# Patient Record
Sex: MALE | Race: WHITE | NOT HISPANIC OR LATINO | Employment: FULL TIME | URBAN - METROPOLITAN AREA
[De-identification: names, ages, dates, MRNs, and addresses within clinical notes are randomized per-mention and may not be internally consistent; named-entity substitution may affect disease eponyms.]

---

## 2018-08-24 ENCOUNTER — OFFICE VISIT - HEALTHEAST (OUTPATIENT)
Dept: FAMILY MEDICINE | Facility: CLINIC | Age: 58
End: 2018-08-24

## 2018-08-24 DIAGNOSIS — K21.00 REFLUX ESOPHAGITIS: ICD-10-CM

## 2018-08-24 DIAGNOSIS — F17.200 TOBACCO USE DISORDER: ICD-10-CM

## 2018-08-24 DIAGNOSIS — F10.20 ALCOHOLISM (H): ICD-10-CM

## 2018-08-24 DIAGNOSIS — Z12.5 SCREENING FOR PROSTATE CANCER: ICD-10-CM

## 2018-08-24 DIAGNOSIS — Z13.220 SCREENING FOR LIPID DISORDERS: ICD-10-CM

## 2018-08-24 DIAGNOSIS — Z11.3 SCREEN FOR STD (SEXUALLY TRANSMITTED DISEASE): ICD-10-CM

## 2018-08-24 DIAGNOSIS — Z00.00 PHYSICAL EXAM: ICD-10-CM

## 2018-08-24 LAB
CHOLEST SERPL-MCNC: 229 MG/DL
FASTING STATUS PATIENT QL REPORTED: NO
FASTING STATUS PATIENT QL REPORTED: NO
GLUCOSE BLD-MCNC: 87 MG/DL (ref 70–125)
HDLC SERPL-MCNC: 45 MG/DL
LDLC SERPL CALC-MCNC: 128 MG/DL
PSA SERPL-MCNC: 3.5 NG/ML (ref 0–3.5)
TRIGL SERPL-MCNC: 282 MG/DL

## 2018-08-24 ASSESSMENT — MIFFLIN-ST. JEOR: SCORE: 1471.03

## 2018-08-27 LAB
HCV AB SERPL QL IA: NEGATIVE
HSV1 IGG SERPL QL IA: POSITIVE
HSV2 IGG SERPL QL IA: POSITIVE

## 2018-08-28 LAB — HSV IGM ANTIBODY: 0.76 INDEX VALUE (ref 0–0.89)

## 2018-08-29 ENCOUNTER — COMMUNICATION - HEALTHEAST (OUTPATIENT)
Dept: FAMILY MEDICINE | Facility: CLINIC | Age: 58
End: 2018-08-29

## 2018-09-10 ENCOUNTER — COMMUNICATION - HEALTHEAST (OUTPATIENT)
Dept: FAMILY MEDICINE | Facility: CLINIC | Age: 58
End: 2018-09-10

## 2019-11-12 ENCOUNTER — AMBULATORY - HEALTHEAST (OUTPATIENT)
Dept: FAMILY MEDICINE | Facility: CLINIC | Age: 59
End: 2019-11-12

## 2019-11-12 ENCOUNTER — COMMUNICATION - HEALTHEAST (OUTPATIENT)
Dept: SCHEDULING | Facility: CLINIC | Age: 59
End: 2019-11-12

## 2019-11-12 DIAGNOSIS — K08.89 PAIN, DENTAL: ICD-10-CM

## 2019-11-29 ENCOUNTER — COMMUNICATION - HEALTHEAST (OUTPATIENT)
Dept: FAMILY MEDICINE | Facility: CLINIC | Age: 59
End: 2019-11-29

## 2020-05-06 ENCOUNTER — COMMUNICATION - HEALTHEAST (OUTPATIENT)
Dept: SCHEDULING | Facility: CLINIC | Age: 60
End: 2020-05-06

## 2020-05-06 ENCOUNTER — COMMUNICATION - HEALTHEAST (OUTPATIENT)
Dept: FAMILY MEDICINE | Facility: CLINIC | Age: 60
End: 2020-05-06

## 2021-05-31 ENCOUNTER — RECORDS - HEALTHEAST (OUTPATIENT)
Dept: ADMINISTRATIVE | Facility: CLINIC | Age: 61
End: 2021-05-31

## 2021-06-01 VITALS — HEIGHT: 66 IN | WEIGHT: 159 LBS | BODY MASS INDEX: 25.55 KG/M2

## 2021-06-02 ENCOUNTER — RECORDS - HEALTHEAST (OUTPATIENT)
Dept: ADMINISTRATIVE | Facility: CLINIC | Age: 61
End: 2021-06-02

## 2021-06-03 NOTE — TELEPHONE ENCOUNTER
Medication Request    Medication name: Antibiotic - Recent ED visit for Dental infection    Pharmacy Name and Location:   [facility]/PHARMACY [id number] [place] [address]    Reason for request:     Reaction to penicillin(RASH)   Patient states RN was to have PCP Rx a different Antibiotic.    When did you use medication last?:  11/11/19    Patient offered appointment:  Medication request.     Okay to leave a detailed message: yes    Please call patient when Rx is approved and send new antibiotic to the pharmacy. listed.

## 2021-06-03 NOTE — TELEPHONE ENCOUNTER
Reason for Disposition    Taking new prescription antibiotic (EXCEPTION: finished taking new prescription antibiotic)    Protocols used: RASH - WIDESPREAD ON DRUGS - DRUG ASIXLJWS-H-MM

## 2021-06-03 NOTE — TELEPHONE ENCOUNTER
Referral Request  Type of referral: Colonoscopy  Who s requesting: Patient  Why the request: 5 year colonoscopy.  Patient has lost siblings to cancer.  Have you been seen for this request: Yes, by another provider elsewhere  Does patient have a preference on a group/provider? [facility], thinks it is located on [address]  Okay to leave a detailed message?  Yes

## 2021-06-03 NOTE — TELEPHONE ENCOUNTER
In order for insurance to pay for colonoscopy in five years, there has to be a history of colon Cancer, not other cancers.

## 2021-06-03 NOTE — TELEPHONE ENCOUNTER
In ED Monday night for infection.  Was prescribed pcn.  Rash on inside of thigh this morning.    No known allergies.    No shortness of breath , no oral swelling.    Rash is noticeable but not itchy. No hives.    Today day 4 of PCN for dental abcess.    Would pcp recommend changing antibiotics at this time? Or continue to monitor.    Patient was advise dhe is overdue for a physical with pcp.    Pharmacy confirmed.    Ok to leave detailed message    Najma Domingo RN, [facility] Connection Nurse Triage/Med Refills RN

## 2021-06-05 ENCOUNTER — HEALTH MAINTENANCE LETTER (OUTPATIENT)
Age: 61
End: 2021-06-05

## 2021-06-08 NOTE — TELEPHONE ENCOUNTER
Who is calling:  Brandon Ryan requesting the same antibiotic the last time.  He took the antibiotic for a tooth infection, and thinks he has some sort of infection, a headache an ear ache on right side of head where the pain was before. So, is requesting the same antibiotic he had. He works for [employer] and he contacted his work nurse and they told him to take 7 days of and quarantine and the nurse suggested he get the antibiotic to see if the headache and ear ache would go away. Did not want triage. Does not need a note for work is just requesting the antibiotic which he can't remember the name of.  Reason for Call:  See above   Date of last appointment with primary care: N/A  Okay to leave a detailed message: Yes

## 2021-06-08 NOTE — TELEPHONE ENCOUNTER
L/M that he needs to make an appt to get this issue addressed as he has not been seen since 2018. Told him to call back and set up an appt.

## 2021-06-08 NOTE — TELEPHONE ENCOUNTER
COVID 19 Nurse Triage Plan/Patient Instructions    Please be aware that novel coronavirus (COVID-19) may be circulating in the community. If you develop symptoms such as fever, cough, or SOB or if you have concerns about the presence of another infection including coronavirus (COVID-19), please contact your health care provider or visit [email or web address].     Disposition/Instructions    Patient to have scheduled Telephone Visit with a provider. Follow System Ambulatory Workflow for COVID 19.     The clinic staff will assist you to schedule an appointment to complete the Telephone Visit with a provider during normal clinic hours.       Call Back If: Your symptoms worsen before you are able to complete your Telephone Visit with a provider.        Thank you for limiting contact with others, wearing a simple mask to cover your cough, practice good hand hygiene habits and accessing our virtual services where possible to limit the spread of this virus.    For more information about COVID19 and options for caring for yourself at home, please visit the CDC website at https://www.cdc.gov/coronavirus/2019-ncov/about/steps-when-sick.html  For more options for care at [facility], please visit our website at [email or web address]    For more information, please use the [place] Department of Health COVID-19 Website: https://www.[email or web address].[place]/diseases/coronavirus/index.html  [place] Department of Health ([facility]) COVID-19 Hotlines (Interpreters available):      Health questions: Phone Number: [phone] or [phone] and Hours: 7 a.m. to 7 p.m.    Schools and  questions: Phone Number: [phone] or [phone] and Hours 7 a.m. to 7 p.m.                  RN triage   Call from pt   Pt states he has headache and R ear pain and scratchy throat and was sent home from work a couple days ago   Pt states scratchy throat and ear pain for a few days   Headache x4 weeks -- Qday --  worse in AM and end off day [ 8/10] -- less in mid day [ 4/10] --  yesterday took 800 mg ibuprofen -- helped some for 4 hrs -- then headache worse again   No head injury - vomiting - no vision changes -- can do chin to chest - no sinus pain   throat = no fever - no diff breathing or swallowing --- can open mouth --- pain upper throat and roof of mouth   Ear pain --- tender to touch -- no drainage -- no redness ---  Headache similar to tooth infection he had 11/2019 -- no tooth pain now --   Reviewed home care advice for headache - scratchy throat and ear pain   transferred to    Lakia Esposito RN [facility] Connection RN triage          Reason for Disposition    Unexplained headache that is present > 24 hours    Protocols used: HEADACHE-A-OH

## 2021-06-16 PROBLEM — F41.9 ANXIETY: Status: ACTIVE | Noted: 2019-11-08

## 2021-06-16 PROBLEM — M79.641 PAIN OF RIGHT HAND: Status: ACTIVE | Noted: 2019-09-09

## 2021-06-16 PROBLEM — B00.9 HSV (HERPES SIMPLEX VIRUS) INFECTION: Status: ACTIVE | Noted: 2018-08-29

## 2021-06-20 NOTE — PROGRESS NOTES
MALE PREVENTATIVE EXAM    Assessment and Plan:       1. Physical exam  Patient overall has relatively good health habits    2. Alcoholism (H)  In remission.     3. Nicotine Dependence  Patient currently smokes 1 pack a day.    4. Screening for prostate cancer  Family history of prostate cancer.  - PSA (Prostatic-Specific Antigen), Annual Screen    5. Screening for lipid disorders     - Glucose  - Lipid Cascade    6. Screen for STD (sexually transmitted disease)  Patient has a history of what sounds like possible recurrent herpes genital infection.  - Herpes Simplex Virus (HSV) IgM Antibody(HSIM)  - Herpes simplex Virus (Type 1 and 2) IgG Antibody  - Hepatitis C Antibody (Anti-HCV)    PLAN:  1.  The patient has a smoking cessation form through his employment, he is eligible for nicotine patches and/or nicotine lozenges.  2.  Laboratory studies as above.  3.  Patient otherwise will continue to maintain healthy habits and should be seen yearly.        Next follow up:  No Follow-up on file.    Immunization Review  Adult Imm Review: No immunizations due today    I discussed the following with the patient:   Adult Healthy Living: Smoking cessation discussed    I have had an Advance Directives discussion with the patient.    Subjective:   Chief Complaint: Brandon Davis is an 58 y.o. male here for a preventative health visit.     HPI:  Px - concerns about DM   Patient was last seen about 3 years ago.  At that time I did a skin culture for herpes which was negative however there was concern that we did not get enough sample and the patient reports that he does get occasional genital outbreaks though does not have one now.  We could do blood work though I also discussed the significance and consequences of both type I and type II herpes.    Patient has a history of alcoholism he has been in recovery for about 18 years.    Patient does smoke about a pack a day, his workplace actually is essentially requiring him to quit  "smoking there is a form for tobacco cessation products, he would like to try either the patches or the lozenges.    Patient otherwise has good health habits he gets a lot of exercise at work.  He is happy with his employment.    Patient's allergies medications problem list, medical history past surgical history family history social history were all reviewed.  Healthy Habits  Are you taking a daily aspirin? No  Do you typically exercising at least 40 min, 3-4 times per week?  Yes- at work walks alot  Do you usually eat at least 4 servings of fruit and vegetables a day, include whole grains and fiber and avoid regularly eating high fat foods? yes  Have you had an eye exam in the past two years? NO  Do you see a dentist twice per year? NO  Do you have any concerns regarding sleep? No    Safety Screen  If you own firearms, are they secured in a locked gun cabinet or with trigger locks? The patient declines to answer  Do you feel you are safe where you are living?: Yes (8/24/2018  1:59 PM)  Do you feel you are safe in your relationship(s)?: Yes (8/24/2018  1:59 PM)    Review of Systems:  Please see above.  The rest of the review of systems are negative for all systems.     Cancer Screening       Status Date      COLONOSCOPY Next Due 1/27/2025      Done 1/27/2015  COLONOSCOPY          Patient Care Team:  Brandon Gonzalez MD as PCP - General        History     Reviewed By Date/Time Sections Reviewed    Brandon Gonzalez MD 8/24/2018  2:35 PM Medical, Surgical, Tobacco, Alcohol, Drug Use, Sexual Activity, Family, Social Documentation, Socioeconomic    Allison Mckeon [facility] 8/24/2018  2:10 PM Tobacco, Alcohol, Drug Use, Sexual Activity            Objective:   Vital Signs:   Visit Vitals     /81     Pulse 84     Ht 5' 5.5\" (1.664 m)     Wt 159 lb (72.1 kg)     SpO2 98%     BMI 26.06 kg/m2          PHYSICAL EXAM  Physical Exam   Constitutional: He is oriented to person, place, and time. He appears well-developed and " well-nourished.   HENT:   Head: Normocephalic and atraumatic.   Right Ear: External ear normal.   Left Ear: External ear normal.   Nose: Nose normal.   Mouth/Throat: Oropharynx is clear and moist.   Eyes: Conjunctivae and EOM are normal. Pupils are equal, round, and reactive to light.   Neck: Normal range of motion. Neck supple.   Cardiovascular: Normal rate and regular rhythm.    Pulmonary/Chest: Effort normal and breath sounds normal.   Abdominal: Soft.   Musculoskeletal: Normal range of motion.   Neurological: He is alert and oriented to person, place, and time. He has normal reflexes.   Skin: Skin is warm and dry.   Psychiatric: He has a normal mood and affect. His behavior is normal. Judgment and thought content normal.       The ASCVD Risk score (Rae WILTON Jr, et al., 2013) failed to calculate for the following reasons:    Cannot find a previous HDL lab    Cannot find a previous total cholesterol lab         Medication List          These changes are accurate as of 8/24/18  4:24 PM.  If you have any questions, ask your nurse or doctor.               STOP taking these medications          acyclovir 400 MG tablet   Also known as:  ZOVIRAX   Stopped by:  Brandon Gonzalez MD       buPROPion 150 MG 12 hr tablet   Also known as:  WELLBUTRIN SR   Stopped by:  Brandon Gonzalez MD       famotidine-calcium carbonate-magnesium hydroxide -165 mg Chew   Also known as:  PEPCID COMPLETE   Stopped by:  Brandon Gonzalez MD             Additional Screenings Completed Today:

## 2021-07-13 ENCOUNTER — OFFICE VISIT (OUTPATIENT)
Dept: FAMILY MEDICINE | Facility: CLINIC | Age: 61
End: 2021-07-13
Payer: COMMERCIAL

## 2021-07-13 VITALS
WEIGHT: 167.38 LBS | OXYGEN SATURATION: 96 % | BODY MASS INDEX: 26.9 KG/M2 | SYSTOLIC BLOOD PRESSURE: 114 MMHG | DIASTOLIC BLOOD PRESSURE: 70 MMHG | HEART RATE: 84 BPM | HEIGHT: 66 IN

## 2021-07-13 DIAGNOSIS — Z12.5 SCREENING FOR PROSTATE CANCER: ICD-10-CM

## 2021-07-13 DIAGNOSIS — F10.20 ALCOHOLISM (H): ICD-10-CM

## 2021-07-13 DIAGNOSIS — F17.200 TOBACCO USE DISORDER: ICD-10-CM

## 2021-07-13 DIAGNOSIS — Z00.00 ROUTINE GENERAL MEDICAL EXAMINATION AT A HEALTH CARE FACILITY: ICD-10-CM

## 2021-07-13 DIAGNOSIS — B00.9 HSV (HERPES SIMPLEX VIRUS) INFECTION: ICD-10-CM

## 2021-07-13 DIAGNOSIS — Z00.00 PHYSICAL EXAM: Primary | ICD-10-CM

## 2021-07-13 DIAGNOSIS — Z13.220 SCREENING FOR LIPID DISORDERS: ICD-10-CM

## 2021-07-13 DIAGNOSIS — Z00.01 ENCOUNTER FOR ROUTINE ADULT MEDICAL EXAM WITH ABNORMAL FINDINGS: ICD-10-CM

## 2021-07-13 PROBLEM — F41.9 ANXIETY: Status: RESOLVED | Noted: 2019-11-08 | Resolved: 2021-07-13

## 2021-07-13 PROBLEM — M79.641 PAIN OF RIGHT HAND: Status: RESOLVED | Noted: 2019-09-09 | Resolved: 2021-07-13

## 2021-07-13 LAB
CHOLEST SERPL-MCNC: 198 MG/DL
FASTING STATUS PATIENT QL REPORTED: NO
FASTING STATUS PATIENT QL REPORTED: NO
GLUCOSE BLD-MCNC: 87 MG/DL (ref 70–125)
HDLC SERPL-MCNC: 42 MG/DL
LDLC SERPL CALC-MCNC: 91 MG/DL
PSA SERPL-MCNC: 3.79 UG/L (ref 0–3.5)
TRIGL SERPL-MCNC: 323 MG/DL

## 2021-07-13 PROCEDURE — 36415 COLL VENOUS BLD VENIPUNCTURE: CPT | Performed by: FAMILY MEDICINE

## 2021-07-13 PROCEDURE — 99396 PREV VISIT EST AGE 40-64: CPT | Mod: 25 | Performed by: FAMILY MEDICINE

## 2021-07-13 PROCEDURE — G0103 PSA SCREENING: HCPCS | Performed by: FAMILY MEDICINE

## 2021-07-13 PROCEDURE — 82947 ASSAY GLUCOSE BLOOD QUANT: CPT | Performed by: FAMILY MEDICINE

## 2021-07-13 PROCEDURE — 80061 LIPID PANEL: CPT | Performed by: FAMILY MEDICINE

## 2021-07-13 ASSESSMENT — MIFFLIN-ST. JEOR: SCORE: 1516.96

## 2021-07-13 NOTE — LETTER
July 14, 2021      Brandon AGARWAL Dougbrianne  [address]  [place] [address]        Dear ,    We are writing to inform you of your test results.  As noted, the PSA or prostate test is just barely elevated, this may not be of any significance but given your family history I am going to have you see a urologist.  Sugars good at 87, no diabetes.  Overall the cholesterol is well controlled with the exception of triglycerides which are a bit high, focus on good diet to help lower this.      Resulted Orders   PSA, screen   Result Value Ref Range    Prostate Specific Antigen Screen 3.79 (H) 0.00 - 3.50 ug/L   GLUCOSE   Result Value Ref Range    Glucose 87 70 - 125 mg/dL    Patient Fasting > 8hrs? No    Lipid panel reflex to direct LDL Fasting   Result Value Ref Range    Cholesterol 198 <=199 mg/dL    Triglycerides 323 (H) <=149 mg/dL    Direct Measure HDL 42 >=40 mg/dL      Comment:      HDL Cholesterol Reference Range:     0-2 years:   No reference ranges established for patients under 2 years old  at [facility] for lipid analytes.    2-8 years:  Greater than 45 mg/dL     18 years and older:   Female: Greater than or equal to 50 mg/dL   Male:   Greater than or equal to 40 mg/dL    LDL Cholesterol Calculated 91 <=129 mg/dL    Patient Fasting > 8hrs? No        If you have any questions or concerns, please call the clinic at the number listed above.       Sincerely,      Brandon Gonzalez MD

## 2021-07-13 NOTE — PROGRESS NOTES
3  SUBJECTIVE:   CC: Brandon Davis is an 60 year old male who presents for preventive health visit.        Patient has been advised of split billing requirements and indicates understanding: No     Patient comes in for a physical examination.  Patient does continue to smoke, he is tried various methods to quit smoking none thus far have been successful, he is getting ready to quit and probably needs to do so cold turkey.    Patient has a history of underlying alcoholism he has been sober for almost 20 years.    Patient has tested positive for HSV-1 and HSV-2, he has not had an outbreak actually in quite a number of years, I reassured him that this is not representative of any underlying disorder or problem which he was worried about.    Overall the patient does have good health habits he is up-to-date on preventive maintenance issues we will check his PSA because of a family history of prostate cancer.  There is a questionable history of colon cancer patient does not have verification of that, he was told however at his last colonoscopy that he is good for 10 years.      Healthy Habits:    Do you get at least three servings of calcium containing foods daily (dairy, green leafy vegetables, etc.)? yes    Amount of exercise or daily activities, outside of work: 5 day(s) per week    Problems taking medications regularly No    Medication side effects: No    Have you had an eye exam in the past two years? yes    Do you see a dentist twice per year? yes    Do you have sleep apnea, excessive snoring or daytime drowsiness?no           Today's PHQ-2 Score:   PHQ-2 ( 1999 Pfizer) 7/13/2021   Q1: Little interest or pleasure in doing things 0   Q2: Feeling down, depressed or hopeless 0   PHQ-2 Score 0   Q1: Little interest or pleasure in doing things Not at all   Q2: Feeling down, depressed or hopeless Not at all   PHQ-2 Score 0       Abuse: Current or Past(Physical, Sexual or Emotional)- No  Do you feel safe in your  "environment? Yes        Social History     Tobacco Use     Smoking status: Current Every Day Smoker     Packs/day: 1.00     Types: Cigarettes, Cigarettes     Smokeless tobacco: Current User     Tobacco comment: need to for work   Substance Use Topics     Alcohol use: No     If you drink alcohol do you typically have >3 drinks per day or >7 drinks per week? Not Applicable                      Last PSA:   Prostate Specific Antigen Screen   Date Value Ref Range Status   08/24/2018 3.5 0.00 - 3.50 ng/mL Final       Reviewed orders with patient. Reviewed health maintenance and updated orders accordingly - Yes  Lab work is in process    Reviewed and updated as needed this visit by clinical staff  Tobacco  Allergies   Problems             Reviewed and updated as needed this visit by Provider     Problems            History reviewed. No pertinent past medical history.   Past Surgical History:   Procedure Laterality Date     PLACEMENT OF DENTAL IMPLANT(S)       OB History   No obstetric history on file.       ROS:  CONSTITUTIONAL: NEGATIVE for fever, chills, change in weight  INTEGUMENTARY/SKIN: NEGATIVE for worrisome rashes, moles or lesions  EYES: NEGATIVE for vision changes or irritation  ENT: NEGATIVE for ear, mouth and throat problems  RESP: NEGATIVE for significant cough or SOB  CV: NEGATIVE for chest pain, palpitations or peripheral edema  GI: NEGATIVE for nausea, abdominal pain, heartburn, or change in bowel habits   male: negative for dysuria, hematuria, decreased urinary stream, erectile dysfunction, urethral discharge  MUSCULOSKELETAL: NEGATIVE for significant arthralgias or myalgia  NEURO: NEGATIVE for weakness, dizziness or paresthesias  PSYCHIATRIC: NEGATIVE for changes in mood or affect    OBJECTIVE:   /70 (BP Location: Right arm, Patient Position: Sitting, Cuff Size: Adult Regular)   Pulse 84   Ht 1.676 m (5' 6\")   Wt 75.9 kg (167 lb 6 oz)   SpO2 96%   BMI 27.02 kg/m    EXAM:  GENERAL: " "healthy, alert and no distress  EYES: Eyes grossly normal to inspection, PERRL and conjunctivae and sclerae normal  HENT: ear canals and TM's normal, nose and mouth without ulcers or lesions  NECK: no adenopathy, no asymmetry, masses, or scars and thyroid normal to palpation  RESP: lungs clear to auscultation - no rales, rhonchi or wheezes  CV: regular rate and rhythm, normal S1 S2, no S3 or S4, no murmur, click or rub, no peripheral edema and peripheral pulses strong  ABDOMEN: soft, nontender, no hepatosplenomegaly, no masses and bowel sounds normal  MS: no gross musculoskeletal defects noted, no edema  SKIN: no suspicious lesions or rashes  NEURO: Normal strength and tone, mentation intact and speech normal  PSYCH: mentation appears normal, affect normal/bright    none     ASSESSMENT/PLAN:   1. Physical exam  Patient overall has some good health habits however nicotine dependence is a major health issue.    2. Routine general medical examination at a health care facility     - GLUCOSE  - Lipid panel reflex to direct LDL Fasting    3. Nicotine Dependence  Patient does continue to smoke though he is getting close to being ready to quit    4. Alcoholism (H)  In remission    5. HSV (herpes simplex virus) infection  Patient with a history of HSV, however he has not had an outbreak in years.    6. Screening for prostate cancer  Family history of.  - PSA, screen       PLAN:  1.  The patient is getting close to being able to quit smoking.  2.  Laboratory studies as above.  3.  Patient otherwise should be seen yearly.    Patient has been advised of split billing requirements and indicates understanding: No  COUNSELING:  Reviewed preventive health counseling, as reflected in patient instructions       Regular exercise       Healthy diet/nutrition    Estimated body mass index is 27.02 kg/m  as calculated from the following:    Height as of this encounter: 1.676 m (5' 6\").    Weight as of this encounter: 75.9 kg (167 lb 6 " oz).        He reports that he has been smoking cigarettes and cigarettes. He has been smoking about 1.00 pack per day. He uses smokeless tobacco.  Tobacco Cessation Action Plan:   Discussed issue      Counseling Resources:  ATP IV Guidelines  Pooled Cohorts Equation Calculator  FRAX Risk Assessment  ICSI Preventive Guidelines  Dietary Guidelines for Americans, 2010  USDA's MyPlate  ASA Prophylaxis  Lung CA Screening    Brandon Gonzalez MD  [place] [address]

## 2021-07-13 NOTE — PROGRESS NOTES
SUBJECTIVE:   CC: Brandon Davis is an 60 year old male who presents for preventative health visit.     {Split Bill scripting  The purpose of this visit is to discuss your medical history and prevent health problems before you are sick. You may be responsible for a co-pay, coinsurance, or deductible if your visit today includes services such as checking on a sore throat, having an x-ray or lab test, or treating and evaluating a new or existing condition :371375}  Patient has been advised of split billing requirements and indicates understanding: Yes  Healthy Habits:     Getting at least 3 servings of Calcium per day:  NO    Bi-annual eye exam:  NO    Dental care twice a year:  Yes    Sleep apnea or symptoms of sleep apnea:  None    Diet:  Regular (no restrictions)    Frequency of exercise:  None    Taking medications regularly:  Yes    Medication side effects:  Not applicable    PHQ-2 Total Score: 0    Additional concerns today:  No      {Outside tests to abstract? :265517}    {additional problems to add (Optional):422533}    Today's PHQ-2 Score:   PHQ-2 ( 1999 Pfizer) 7/13/2021   Q1: Little interest or pleasure in doing things 0   Q2: Feeling down, depressed or hopeless 0   PHQ-2 Score 0   Q1: Little interest or pleasure in doing things Not at all   Q2: Feeling down, depressed or hopeless Not at all   PHQ-2 Score 0       Abuse: Current or Past(Physical, Sexual or Emotional)- No  Do you feel safe in your environment? Yes        Social History     Tobacco Use     Smoking status: Current Every Day Smoker     Packs/day: 1.00     Types: Cigarettes, Cigarettes     Smokeless tobacco: Current User     Tobacco comment: need to for work   Substance Use Topics     Alcohol use: No     If you drink alcohol do you typically have >3 drinks per day or >7 drinks per week? NO  {Complete AUDIT in Assessments section of the Visit Navigator and Refresh}    Alcohol Use 7/13/2021   Prescreen: >3 drinks/day or >7 drinks/week? Not  "Applicable   Prescreen: >3 drinks/day or >7 drinks/week? -   {add AUDIT responses (Optional) (A score of 7 for adult men is an indication of hazardous drinking; a score of 8 or more is an indication of an alcohol use disorder.  A score of 7 or more for adult women is an indication of hazardous drinking or an alchohol use disorder):104389}    Last PSA:   Prostate Specific Antigen Screen   Date Value Ref Range Status   08/24/2018 3.5 0.00 - 3.50 ng/mL Final       Reviewed orders with patient. Reviewed health maintenance and updated orders accordingly - Yes  {Chronicprobdata (optional):789386}    Reviewed and updated as needed this visit by clinical staff  Tobacco  Allergies   Problems             Reviewed and updated as needed this visit by Provider     Problems            {HISTORY OPTIONS (Optional):549621}    Review of Systems  CONSTITUTIONAL: NEGATIVE for fever, chills, change in weight  INTEGUMENTARY/SKIN: NEGATIVE for worrisome rashes, moles or lesions  EYES: NEGATIVE for vision changes or irritation  ENT: NEGATIVE for ear, mouth and throat problems  RESP: NEGATIVE for significant cough or SOB  CV: NEGATIVE for chest pain, palpitations or peripheral edema  GI: NEGATIVE for nausea, abdominal pain, heartburn, or change in bowel habits   male: negative for dysuria, hematuria, decreased urinary stream, erectile dysfunction, urethral discharge  MUSCULOSKELETAL: NEGATIVE for significant arthralgias or myalgia  NEURO: NEGATIVE for weakness, dizziness or paresthesias  PSYCHIATRIC: NEGATIVE for changes in mood or affect    OBJECTIVE:   /70 (BP Location: Right arm, Patient Position: Sitting, Cuff Size: Adult Regular)   Pulse 84   Ht 1.676 m (5' 6\")   Wt 75.9 kg (167 lb 6 oz)   SpO2 96%   BMI 27.02 kg/m      Physical Exam  {Exam Choices (Optional):731729}    Diagnostic Test Results:  Labs reviewed in Epic    ASSESSMENT/PLAN:   {Diag Picklist:274316}    Patient has been advised of split billing requirements " "and indicates understanding: Yes  COUNSELING:   Reviewed preventive health counseling, as reflected in patient instructions    Estimated body mass index is 27.02 kg/m  as calculated from the following:    Height as of this encounter: 1.676 m (5' 6\").    Weight as of this encounter: 75.9 kg (167 lb 6 oz).     {Weight Management Plan (ACO) Complete if BMI is abnormal-  Ages 18-64  BMI >24.9.  Age 65+ with BMI <23 or >30 (Optional):163818}    He reports that he has been smoking cigarettes and cigarettes. He has been smoking about 1.00 pack per day. He uses smokeless tobacco.  Tobacco Cessation Action Plan:   {TOBACCO CESSATION ACTION PLAN:757881}      Counseling Resources:  ATP IV Guidelines  Pooled Cohorts Equation Calculator  FRAX Risk Assessment  ICSI Preventive Guidelines  Dietary Guidelines for Americans, 2010  USDA's MyPlate  ASA Prophylaxis  Lung CA Screening    Brandon Gonzalez MD  [facility]  "

## 2021-07-14 DIAGNOSIS — R97.20 ELEVATED PROSTATE SPECIFIC ANTIGEN (PSA): Primary | ICD-10-CM

## 2021-08-11 ENCOUNTER — PRE VISIT (OUTPATIENT)
Dept: UROLOGY | Facility: CLINIC | Age: 61
End: 2021-08-11

## 2021-09-23 ENCOUNTER — PRE VISIT (OUTPATIENT)
Dept: UROLOGY | Facility: CLINIC | Age: 61
End: 2021-09-23

## 2021-09-23 NOTE — TELEPHONE ENCOUNTER
Reason for visit: consult for elevated PSA      Dx/Hx/Sx: elevated PSA, family history of prostate cancer     Records/imaging/labs/orders: PSA in epic     At Rooming: video visit

## 2021-09-25 ENCOUNTER — HEALTH MAINTENANCE LETTER (OUTPATIENT)
Age: 61
End: 2021-09-25

## 2021-09-27 ENCOUNTER — VIRTUAL VISIT (OUTPATIENT)
Dept: UROLOGY | Facility: CLINIC | Age: 61
End: 2021-09-27
Attending: FAMILY MEDICINE
Payer: COMMERCIAL

## 2021-09-27 DIAGNOSIS — R97.20 ELEVATED PROSTATE SPECIFIC ANTIGEN (PSA): ICD-10-CM

## 2021-09-27 PROCEDURE — 99204 OFFICE O/P NEW MOD 45 MIN: CPT | Mod: GT | Performed by: UROLOGY

## 2021-09-27 NOTE — PROGRESS NOTES
Brandon is a 61 year old who is being evaluated via a billable video visit.      How would you like to obtain your AVS? Mail a copy  If the video visit is dropped, the invitation should be resent by: Text to cell phone: [phone]  Will anyone else be joining your video visit? No      Video Start Time:2:50pm  Video-Visit Details  CC: elevated PSA    HPI: 62 yo male with PSA noted to be 3.79 ng/mL on 21 up from baseline of 3.5 ng/mL as far back as 6 years ago.  Patient notes he has some malodorous urine and some nocturia and will sometimes have hesitation if he hold it too long.    PMHx: none  PSHx: none  MEDS: none  Allergy: PCN (rash)  SocHx: tobac: 1ppd; Etoh: none  FHx: 2 brothers with CaP (both )  ROS: neg f/c/s, n/v, wt changes    OBJECTIVE:  PSA 3.79 ng/mL  3.5 ng/mL 18      ASSESSMENT AND PLAN:   Over half of today's 46-minute visit was spent reviewing counseling the patient regarding his PSA and family history.  I counseled the patient that while his PSA is still under 4.0 ng/mL, it is close, and above average for age.  In addition given his compelling family history of prostate cancer in 2 brothers who  of the disease, we will want to exclude prostate cancer at this point.  We will have the patient get a prostate MRI and then come o clinic for a PVR check and go over the results of the MRI and decide next steps at that time.  The patient is in agreement with the plan.  Type of service:  Video Visit    Video End Time:3:27pm    Originating Location (pt. Location): Home    Distant Location (provider location):  [facility] [place]     Platform used for Video Visit: MiSiedo

## 2021-09-27 NOTE — LETTER
2021       RE: Brandon Davis  [address]  [place] [address]     Dear Colleague,    Thank you for referring your patient, Brandon Davis, to the [facility] [place] at [facility]. Please see a copy of my visit note below.    Brandon is a 61 year old who is being evaluated via a billable video visit.      How would you like to obtain your AVS? Mail a copy  If the video visit is dropped, the invitation should be resent by: Text to cell phone: [phone]  Will anyone else be joining your video visit? No      Video Start Time:2:50pm  Video-Visit Details  CC: elevated PSA    HPI: 60 yo male with PSA noted to be 3.79 ng/mL on 21 up from baseline of 3.5 ng/mL as far back as 6 years ago.  Patient notes he has some malodorous urine and some nocturia and will sometimes have hesitation if he hold it too long.    PMHx: none  PSHx: none  MEDS: none  Allergy: PCN (rash)  SocHx: tobac: 1ppd; Etoh: none  FHx: 2 brothers with CaP (both )  ROS: neg f/c/s, n/v, wt changes    OBJECTIVE:  PSA 3.79 ng/mL  3.5 ng/mL 18      ASSESSMENT AND PLAN:   Over half of today's 46-minute visit was spent reviewing counseling the patient regarding his PSA and family history.  I counseled the patient that while his PSA is still under 4.0 ng/mL, it is close, and above average for age.  In addition given his compelling family history of prostate cancer in 2 brothers who  of the disease, we will want to exclude prostate cancer at this point.  We will have the patient get a prostate MRI and then come o clinic for a PVR check and go over the results of the MRI and decide next steps at that time.  The patient is in agreement with the plan.  Type of service:  Video Visit    Video End Time:3:27pm    Originating Location (pt. Location): Home    Distant Location (provider location):  [facility] [place]     Platform used for Video Visit:  [employer]    Again, thank you for allowing me to participate in the care of your patient.      Sincerely,    Kevin Olivia MD

## 2021-09-28 NOTE — PATIENT INSTRUCTIONS
Please get a prostate MRI and then follow up in clinic for a PVR check and MRI results with Dr. Olivia.    It was a pleasure meeting with you today.  Thank you for allowing me and my team the privilege of caring for you today.  YOU are the reason we are here, and I truly hope we provided you with the excellent service you deserve.  Please let us know if there is anything else we can do for you so that we can be sure you are leaving completely satisfied with your care experience.

## 2021-10-20 ENCOUNTER — HOSPITAL ENCOUNTER (OUTPATIENT)
Dept: MRI IMAGING | Facility: CLINIC | Age: 61
Discharge: HOME | End: 2021-10-20
Attending: UROLOGY | Admitting: UROLOGY
Payer: COMMERCIAL

## 2021-10-20 DIAGNOSIS — R97.20 ELEVATED PROSTATE SPECIFIC ANTIGEN (PSA): ICD-10-CM

## 2021-10-20 PROCEDURE — 255N000002 HC RX 255 OP 636: Performed by: UROLOGY

## 2021-10-20 PROCEDURE — 72197 MRI PELVIS W/O & W/DYE: CPT

## 2021-10-20 PROCEDURE — 72197 MRI PELVIS W/O & W/DYE: CPT | Mod: 26 | Performed by: RADIOLOGY

## 2021-10-20 PROCEDURE — A9585 GADOBUTROL INJECTION: HCPCS | Performed by: UROLOGY

## 2021-10-20 RX ORDER — GADOBUTROL 604.72 MG/ML
7.5 INJECTION INTRAVENOUS ONCE
Status: COMPLETED | OUTPATIENT
Start: 2021-10-20 | End: 2021-10-20

## 2021-10-20 RX ADMIN — GADOBUTROL 7.5 ML: 604.72 INJECTION INTRAVENOUS at 10:29

## 2021-11-16 ENCOUNTER — PRE VISIT (OUTPATIENT)
Dept: UROLOGY | Facility: CLINIC | Age: 61
End: 2021-11-16
Payer: COMMERCIAL

## 2021-11-16 NOTE — TELEPHONE ENCOUNTER
Reason for visit: follow up elevated PSA     Dx/Hx/Sx: elevated PSA    Records/imaging/labs/orders: MRI in epic    At Rooming: void/PVR

## 2021-12-06 ENCOUNTER — OFFICE VISIT (OUTPATIENT)
Dept: UROLOGY | Facility: CLINIC | Age: 61
End: 2021-12-06
Payer: COMMERCIAL

## 2021-12-06 VITALS
HEIGHT: 68 IN | HEART RATE: 86 BPM | BODY MASS INDEX: 25.01 KG/M2 | SYSTOLIC BLOOD PRESSURE: 130 MMHG | WEIGHT: 165 LBS | DIASTOLIC BLOOD PRESSURE: 82 MMHG

## 2021-12-06 DIAGNOSIS — R97.20 ELEVATED PROSTATE SPECIFIC ANTIGEN (PSA): Primary | ICD-10-CM

## 2021-12-06 PROCEDURE — 51798 US URINE CAPACITY MEASURE: CPT | Performed by: UROLOGY

## 2021-12-06 PROCEDURE — 99214 OFFICE O/P EST MOD 30 MIN: CPT | Mod: 25 | Performed by: UROLOGY

## 2021-12-06 ASSESSMENT — MIFFLIN-ST. JEOR: SCORE: 1532.94

## 2021-12-06 ASSESSMENT — PAIN SCALES - GENERAL: PAINLEVEL: MODERATE PAIN (5)

## 2021-12-06 NOTE — PATIENT INSTRUCTIONS
Please get a PSA with your primary care. Follow up with Dr. Olivia as needed    It was a pleasure meeting with you today.  Thank you for allowing me and my team the privilege of caring for you today.  YOU are the reason we are here, and I truly hope we provided you with the excellent service you deserve.  Please let us know if there is anything else we can do for you so that we can be sure you are leaving completely satisfied with your care experience.

## 2021-12-06 NOTE — PROGRESS NOTES
CC: elevated PSA     HPI: 62 yo male with PSA noted to be 3.79 ng/mL on 21 up from baseline of 3.5 ng/mL as far back as 6 years ago.  Patient notes he has some malodorous urine and some nocturia and will sometimes have hesitation if he hold it too long.  He does have a family history of prostate cancer in two brothers who  of their cancer.  He recently underwent a prostate MRI.    OBJECTIVE: MRI form 10/20/21 shows a volume of 59 ml and PIRADS 2 lesions only.    PVR: 50cc    MONICO: bilat enlarged lobes with diffuse firmness, but no nodules    ASSESSMENT AND PLAN:  Over half of today's 39-minute visit was spent reviewing the chart, results and counseling the patient regarding his MRI.  We are pleased to see no highly suspicious lesions on the MRI.  We discussed options of continued observation with repeat PSA, going forward with a biopsy or use of other risk stratification tools.  After discussion of risks and benefits, the patient will rechcek his PSA in 6 months with his primary doctor.  We discussed that there is about a 10% chance that clinically significant disease is present that the MRI didn't see.  He will come back to see us if the PSA rises significantly further.

## 2021-12-06 NOTE — LETTER
2021       RE: Brandon Davis  [address]  [place] [address]     Dear Colleague,    Thank you for referring your patient, Brandon Davis, to the [facility] [place] at . Please see a copy of my visit note below.    CC: elevated PSA     HPI: 60 yo male with PSA noted to be 3.79 ng/mL on 21 up from baseline of 3.5 ng/mL as far back as 6 years ago.  Patient notes he has some malodorous urine and some nocturia and will sometimes have hesitation if he hold it too long.  He does have a family history of prostate cancer in two brothers who  of their cancer.  He recently underwent a prostate MRI.    OBJECTIVE: MRI form 10/20/21 shows a volume of 59 ml and PIRADS 2 lesions only.    PVR: 50cc    MONICO: bilat enlarged lobes with diffuse firmness, but no nodules    ASSESSMENT AND PLAN:  Over half of today's 39-minute visit was spent reviewing the chart, results and counseling the patient regarding his MRI.  We are pleased to see no highly suspicious lesions on the MRI.  We discussed options of continued observation with repeat PSA, going forward with a biopsy or use of other risk stratification tools.  After discussion of risks and benefits, the patient will rechcek his PSA in 6 months with his primary doctor.  We discussed that there is about a 10% chance that clinically significant disease is present that the MRI didn't see.  He will come back to see us if the PSA rises significantly further.    Again, thank you for allowing me to participate in the care of your patient.      Sincerely,    Kevin Olivia MD

## 2021-12-06 NOTE — NURSING NOTE
"Chief Complaint   Patient presents with     Follow Up     elevated PSA       Blood pressure 130/82, pulse 86, height 1.727 m (5' 8\"), weight 74.8 kg (165 lb). Body mass index is 25.09 kg/m .    Patient Active Problem List   Diagnosis     Nicotine Dependence     Alcoholism (H)     HSV (herpes simplex virus) infection       Allergies   Allergen Reactions     Penicillins Rash       No current outpatient medications on file.       Social History     Tobacco Use     Smoking status: Current Every Day Smoker     Packs/day: 1.00     Types: Cigarettes     Smokeless tobacco: Former User     Tobacco comment: need to for work   Substance Use Topics     Alcohol use: No     Drug use: No       Ericka Gill  12/6/2021  8:41 AM  "

## 2022-02-22 ENCOUNTER — TRANSFERRED RECORDS (OUTPATIENT)
Dept: HEALTH INFORMATION MANAGEMENT | Facility: CLINIC | Age: 62
End: 2022-02-22
Payer: COMMERCIAL

## 2022-03-01 ENCOUNTER — TRANSFERRED RECORDS (OUTPATIENT)
Dept: HEALTH INFORMATION MANAGEMENT | Facility: CLINIC | Age: 62
End: 2022-03-01
Payer: COMMERCIAL

## 2022-03-22 ENCOUNTER — TELEPHONE (OUTPATIENT)
Dept: FAMILY MEDICINE | Facility: CLINIC | Age: 62
End: 2022-03-22
Payer: COMMERCIAL

## 2022-03-22 NOTE — TELEPHONE ENCOUNTER
Reason for Call:  Other forms    Detailed comments: Patient called and was wondering if PCP has received prior authorization for his upcoming surgery. Patient is unsure how the document is supposes to get to us and will reach out to [place]. Please advise    Phone Number Patient can be reached at: Home number on file [phone] (home)    Best Time: Any    Can we leave a detailed message on this number? YES    Call taken on 3/22/2022 at 10:34 AM by Mikki Garnett

## 2022-03-23 NOTE — TELEPHONE ENCOUNTER
I do not believe I have seen any forms, and I do not have anything to do with authorization of surgery.

## 2022-03-29 ENCOUNTER — TRANSFERRED RECORDS (OUTPATIENT)
Dept: HEALTH INFORMATION MANAGEMENT | Facility: CLINIC | Age: 62
End: 2022-03-29
Payer: COMMERCIAL

## 2022-04-05 ENCOUNTER — TRANSFERRED RECORDS (OUTPATIENT)
Dept: HEALTH INFORMATION MANAGEMENT | Facility: CLINIC | Age: 62
End: 2022-04-05
Payer: COMMERCIAL

## 2022-05-12 ENCOUNTER — TRANSFERRED RECORDS (OUTPATIENT)
Dept: HEALTH INFORMATION MANAGEMENT | Facility: CLINIC | Age: 62
End: 2022-05-12
Payer: COMMERCIAL

## 2022-06-23 ENCOUNTER — TRANSFERRED RECORDS (OUTPATIENT)
Dept: HEALTH INFORMATION MANAGEMENT | Facility: CLINIC | Age: 62
End: 2022-06-23

## 2022-07-21 ENCOUNTER — TRANSFERRED RECORDS (OUTPATIENT)
Dept: HEALTH INFORMATION MANAGEMENT | Facility: CLINIC | Age: 62
End: 2022-07-21

## 2022-08-27 ENCOUNTER — HEALTH MAINTENANCE LETTER (OUTPATIENT)
Age: 62
End: 2022-08-27

## 2023-04-11 ENCOUNTER — TRANSFERRED RECORDS (OUTPATIENT)
Dept: HEALTH INFORMATION MANAGEMENT | Facility: CLINIC | Age: 63
End: 2023-04-11
Payer: COMMERCIAL

## 2023-04-22 ENCOUNTER — HEALTH MAINTENANCE LETTER (OUTPATIENT)
Age: 63
End: 2023-04-22

## 2023-05-23 ENCOUNTER — TRANSFERRED RECORDS (OUTPATIENT)
Dept: HEALTH INFORMATION MANAGEMENT | Facility: CLINIC | Age: 63
End: 2023-05-23
Payer: COMMERCIAL

## 2023-09-23 ENCOUNTER — HEALTH MAINTENANCE LETTER (OUTPATIENT)
Age: 63
End: 2023-09-23

## 2023-09-26 ENCOUNTER — TRANSFERRED RECORDS (OUTPATIENT)
Dept: HEALTH INFORMATION MANAGEMENT | Facility: CLINIC | Age: 63
End: 2023-09-26
Payer: COMMERCIAL

## 2023-10-03 ENCOUNTER — TRANSFERRED RECORDS (OUTPATIENT)
Dept: HEALTH INFORMATION MANAGEMENT | Facility: CLINIC | Age: 63
End: 2023-10-03
Payer: COMMERCIAL

## 2023-11-09 ENCOUNTER — TRANSFERRED RECORDS (OUTPATIENT)
Dept: HEALTH INFORMATION MANAGEMENT | Facility: CLINIC | Age: 63
End: 2023-11-09
Payer: COMMERCIAL

## 2023-12-21 ENCOUNTER — TRANSFERRED RECORDS (OUTPATIENT)
Dept: HEALTH INFORMATION MANAGEMENT | Facility: CLINIC | Age: 63
End: 2023-12-21
Payer: COMMERCIAL

## 2024-02-01 ENCOUNTER — TRANSFERRED RECORDS (OUTPATIENT)
Dept: HEALTH INFORMATION MANAGEMENT | Facility: CLINIC | Age: 64
End: 2024-02-01
Payer: COMMERCIAL

## 2024-11-16 ENCOUNTER — HEALTH MAINTENANCE LETTER (OUTPATIENT)
Age: 64
End: 2024-11-16